# Patient Record
Sex: FEMALE | Race: WHITE | NOT HISPANIC OR LATINO | Employment: OTHER | URBAN - METROPOLITAN AREA
[De-identification: names, ages, dates, MRNs, and addresses within clinical notes are randomized per-mention and may not be internally consistent; named-entity substitution may affect disease eponyms.]

---

## 2018-09-10 ENCOUNTER — HOSPITAL ENCOUNTER (EMERGENCY)
Facility: HOSPITAL | Age: 73
Discharge: HOME | End: 2018-09-10
Attending: EMERGENCY MEDICINE
Payer: COMMERCIAL

## 2018-09-10 VITALS
RESPIRATION RATE: 13 BRPM | WEIGHT: 220 LBS | OXYGEN SATURATION: 95 % | HEART RATE: 58 BPM | SYSTOLIC BLOOD PRESSURE: 178 MMHG | TEMPERATURE: 98 F | DIASTOLIC BLOOD PRESSURE: 94 MMHG | HEIGHT: 66 IN | BODY MASS INDEX: 35.36 KG/M2

## 2018-09-10 DIAGNOSIS — R00.1 BRADYCARDIA: ICD-10-CM

## 2018-09-10 DIAGNOSIS — G89.28 CHRONIC KNEE PAIN AFTER TOTAL REPLACEMENT OF BOTH KNEE JOINTS: ICD-10-CM

## 2018-09-10 DIAGNOSIS — I10 HYPERTENSION, UNCONTROLLED: Primary | ICD-10-CM

## 2018-09-10 DIAGNOSIS — M25.561 CHRONIC KNEE PAIN AFTER TOTAL REPLACEMENT OF BOTH KNEE JOINTS: ICD-10-CM

## 2018-09-10 DIAGNOSIS — Z96.653 CHRONIC KNEE PAIN AFTER TOTAL REPLACEMENT OF BOTH KNEE JOINTS: ICD-10-CM

## 2018-09-10 DIAGNOSIS — M25.562 CHRONIC KNEE PAIN AFTER TOTAL REPLACEMENT OF BOTH KNEE JOINTS: ICD-10-CM

## 2018-09-10 DIAGNOSIS — G47.00 INSOMNIA, UNSPECIFIED TYPE: ICD-10-CM

## 2018-09-10 LAB
ALBUMIN SERPL BCP-MCNC: 4.5 G/DL
ALP SERPL-CCNC: 48 U/L
ALT SERPL W/O P-5'-P-CCNC: 12 U/L
ANION GAP SERPL CALC-SCNC: 11 MMOL/L
AST SERPL-CCNC: 20 U/L
BASOPHILS # BLD AUTO: 0.04 K/UL
BASOPHILS NFR BLD: 0.6 %
BILIRUB SERPL-MCNC: 0.6 MG/DL
BNP SERPL-MCNC: 290 PG/ML
BUN SERPL-MCNC: 13 MG/DL
CALCIUM SERPL-MCNC: 9.5 MG/DL
CHLORIDE SERPL-SCNC: 97 MMOL/L
CO2 SERPL-SCNC: 33 MMOL/L
CREAT SERPL-MCNC: 1.2 MG/DL
D DIMER PPP FEU-MCNC: 276 NG/ML
DIFFERENTIAL METHOD: ABNORMAL
EOSINOPHIL # BLD AUTO: 0.3 K/UL
EOSINOPHIL NFR BLD: 3.6 %
ERYTHROCYTE [DISTWIDTH] IN BLOOD BY AUTOMATED COUNT: 14.5 %
EST. GFR  (AFRICAN AMERICAN): 51.4 ML/MIN/1.73 M^2
EST. GFR  (NON AFRICAN AMERICAN): 44.6 ML/MIN/1.73 M^2
GLUCOSE SERPL-MCNC: 95 MG/DL
HCT VFR BLD AUTO: 39.9 %
HGB BLD-MCNC: 13.1 G/DL
IMM GRANULOCYTES # BLD AUTO: 0.05 K/UL
IMM GRANULOCYTES NFR BLD AUTO: 0.7 %
INR PPP: 1
LYMPHOCYTES # BLD AUTO: 1.3 K/UL
LYMPHOCYTES NFR BLD: 17.9 %
MCH RBC QN AUTO: 33.3 PG
MCHC RBC AUTO-ENTMCNC: 32.8 G/DL
MCV RBC AUTO: 102 FL
MONOCYTES # BLD AUTO: 0.8 K/UL
MONOCYTES NFR BLD: 11.3 %
NEUTROPHILS # BLD AUTO: 4.7 K/UL
NEUTROPHILS NFR BLD: 65.9 %
NRBC BLD-RTO: 0 /100 WBC
PLATELET # BLD AUTO: 292 K/UL
PMV BLD AUTO: 9.5 FL
POTASSIUM SERPL-SCNC: 3.6 MMOL/L
PROT SERPL-MCNC: 7.2 G/DL
PROTHROMBIN TIME: 11 SEC
RBC # BLD AUTO: 3.93 M/UL
SODIUM SERPL-SCNC: 141 MMOL/L
TROPONIN I SERPL DL<=0.01 NG/ML-MCNC: 0.01 NG/ML
WBC # BLD AUTO: 7.15 K/UL

## 2018-09-10 PROCEDURE — 71045 X-RAY EXAM CHEST 1 VIEW: CPT | Mod: 26,,, | Performed by: RADIOLOGY

## 2018-09-10 PROCEDURE — 85025 COMPLETE CBC W/AUTO DIFF WBC: CPT

## 2018-09-10 PROCEDURE — 99284 EMERGENCY DEPT VISIT MOD MDM: CPT | Mod: 25

## 2018-09-10 PROCEDURE — 85379 FIBRIN DEGRADATION QUANT: CPT

## 2018-09-10 PROCEDURE — 71045 X-RAY EXAM CHEST 1 VIEW: CPT | Mod: TC,FY

## 2018-09-10 PROCEDURE — 25000003 PHARM REV CODE 250: Performed by: EMERGENCY MEDICINE

## 2018-09-10 PROCEDURE — 80053 COMPREHEN METABOLIC PANEL: CPT

## 2018-09-10 PROCEDURE — 85610 PROTHROMBIN TIME: CPT

## 2018-09-10 PROCEDURE — 84484 ASSAY OF TROPONIN QUANT: CPT

## 2018-09-10 PROCEDURE — 83880 ASSAY OF NATRIURETIC PEPTIDE: CPT

## 2018-09-10 RX ORDER — ASPIRIN 81 MG/1
81 TABLET ORAL DAILY
COMMUNITY

## 2018-09-10 RX ORDER — HYDRALAZINE HYDROCHLORIDE 25 MG/1
25 TABLET, FILM COATED ORAL
Status: COMPLETED | OUTPATIENT
Start: 2018-09-10 | End: 2018-09-10

## 2018-09-10 RX ORDER — PANTOPRAZOLE SODIUM 40 MG/1
40 TABLET, DELAYED RELEASE ORAL DAILY
COMMUNITY

## 2018-09-10 RX ORDER — ZOLPIDEM TARTRATE 5 MG/1
5 TABLET ORAL NIGHTLY PRN
Qty: 30 TABLET | Refills: 0 | Status: SHIPPED | OUTPATIENT
Start: 2018-09-10

## 2018-09-10 RX ORDER — DABIGATRAN ETEXILATE 150 MG/1
CAPSULE ORAL
COMMUNITY

## 2018-09-10 RX ORDER — FUROSEMIDE 40 MG/1
40 TABLET ORAL DAILY
COMMUNITY
End: 2018-09-10 | Stop reason: SDUPTHER

## 2018-09-10 RX ORDER — HYDRALAZINE HYDROCHLORIDE 50 MG/1
50 TABLET, FILM COATED ORAL EVERY 12 HOURS
Qty: 60 TABLET | Refills: 0 | Status: SHIPPED | OUTPATIENT
Start: 2018-09-10

## 2018-09-10 RX ORDER — HYDRALAZINE HYDROCHLORIDE 10 MG/1
10 TABLET, FILM COATED ORAL EVERY 12 HOURS
COMMUNITY
End: 2018-09-10 | Stop reason: SDUPTHER

## 2018-09-10 RX ORDER — FUROSEMIDE 40 MG/1
40 TABLET ORAL DAILY
Qty: 30 TABLET | Refills: 0 | Status: SHIPPED | OUTPATIENT
Start: 2018-09-10

## 2018-09-10 RX ORDER — CARVEDILOL 6.25 MG/1
6.25 TABLET ORAL 2 TIMES DAILY WITH MEALS
COMMUNITY
End: 2018-09-10 | Stop reason: SDUPTHER

## 2018-09-10 RX ORDER — CARVEDILOL 6.25 MG/1
3.12 TABLET ORAL 2 TIMES DAILY WITH MEALS
Qty: 60 TABLET | Refills: 0 | Status: SHIPPED | OUTPATIENT
Start: 2018-09-10

## 2018-09-10 RX ORDER — AMIODARONE HYDROCHLORIDE 200 MG/1
TABLET ORAL 2 TIMES DAILY
COMMUNITY
End: 2018-09-10 | Stop reason: SDUPTHER

## 2018-09-10 RX ORDER — ZOLPIDEM TARTRATE 5 MG/1
5 TABLET ORAL NIGHTLY PRN
COMMUNITY
End: 2018-09-10 | Stop reason: SDUPTHER

## 2018-09-10 RX ORDER — FERROUS SULFATE 325(65) MG
325 TABLET ORAL
COMMUNITY

## 2018-09-10 RX ORDER — ISOSORBIDE DINITRATE 30 MG/1
30 TABLET ORAL DAILY
COMMUNITY

## 2018-09-10 RX ORDER — HYDROCODONE BITARTRATE AND ACETAMINOPHEN 10; 325 MG/1; MG/1
1 TABLET ORAL
COMMUNITY
End: 2018-09-10 | Stop reason: SDUPTHER

## 2018-09-10 RX ORDER — AMIODARONE HYDROCHLORIDE 200 MG/1
200 TABLET ORAL 2 TIMES DAILY
Qty: 60 TABLET | Refills: 0 | Status: SHIPPED | OUTPATIENT
Start: 2018-09-10

## 2018-09-10 RX ORDER — HYDROCODONE BITARTRATE AND ACETAMINOPHEN 10; 325 MG/1; MG/1
1 TABLET ORAL EVERY 6 HOURS PRN
Qty: 18 TABLET | Refills: 0 | Status: SHIPPED | OUTPATIENT
Start: 2018-09-10

## 2018-09-10 RX ORDER — ROSUVASTATIN CALCIUM 5 MG/1
5 TABLET, COATED ORAL DAILY
COMMUNITY

## 2018-09-10 RX ORDER — ONDANSETRON 4 MG/1
4 TABLET, FILM COATED ORAL EVERY 8 HOURS PRN
COMMUNITY

## 2018-09-10 RX ADMIN — HYDRALAZINE HYDROCHLORIDE 25 MG: 25 TABLET, FILM COATED ORAL at 04:09

## 2018-09-10 NOTE — ED NOTES
Pt resting with friend at bedside. Pt denies chest pain or sob at this time. No complaints or concerns at this time.

## 2018-09-10 NOTE — ED NOTES
Patient refusing to be admitted to hospital. Dr. Tovar speaking with Cristina, case management to get follow up and establish relationship with PCP and Dr. Tovar.

## 2018-09-10 NOTE — ED NOTES
Discharge instructions given to patient both verbal and printed. Educated patient to follow up with Dr. Tovar and PCP as instructed and take medications as prescribed and discussed with Dr. Abdi and Dr. Tovar. Encouraged patient to return to ER as needed for new or worsening symptoms arise. Verbalized understanding. No questions or concerns at this time. Patient ambulatory with steady gait to registration and into the care of her friend.

## 2018-09-10 NOTE — ED PROVIDER NOTES
Encounter Date: 9/10/2018       History     Chief Complaint   Patient presents with    Shortness of Breath     Pt just moved to town. Pt hasn't established primary care. Pt reports being out of some medications. Sob x 2 weeks.     73-year-old female with history of coronary artery disease atrial fibrillation now presents with complaints of shortness of breath, low heart rate and elevated blood pressure.    In the past week she has moved from [place] and has been noncompliant with some of her medications but reports good compliance with the cardioactive medications    She specifically is out of her narcotic analgesia for her bilateral knee pain s/p TKA      Denies acute chest pain  Denies hemoptysis    Pradaxa  Hydralazine  Coreg  Webber  Lasix 40 q AM              Review of patient's allergies indicates:   Allergen Reactions    Statins-hmg-coa reductase inhibitors     Tetracyclines      Past Medical History:   Diagnosis Date    Anticoagulant long-term use     Asthma     Atrial fibrillation     CHF (congestive heart failure)     Coronary artery disease     History of heart artery stent     Hypertension     MI (myocardial infarction)      Past Surgical History:   Procedure Laterality Date    KNEE SURGERY      bl knee surgery    RETINAL DETACHMENT SURGERY      ROTATOR CUFF REPAIR      SHOULDER SURGERY       No family history on file.  Social History     Tobacco Use    Smoking status: Former Smoker    Smokeless tobacco: Never Used   Substance Use Topics    Alcohol use: Yes     Alcohol/week: 4.2 oz     Types: 7 Glasses of wine per week    Drug use: No     Review of Systems   Constitutional: Negative.    HENT: Negative.    Respiratory: Positive for shortness of breath. Negative for apnea, cough, choking, chest tightness, wheezing and stridor.    Cardiovascular: Negative.  Negative for chest pain, palpitations and leg swelling.   Gastrointestinal: Negative.    Musculoskeletal: Positive for arthralgias.  Negative for back pain, gait problem, joint swelling, myalgias, neck pain and neck stiffness.   Neurological: Negative.    Hematological: Negative.    Psychiatric/Behavioral: Negative for confusion.   All other systems reviewed and are negative.      Physical Exam     Initial Vitals [09/10/18 1335]   BP Pulse Resp Temp SpO2   (!) 200/115 (!) 51 20 98.3 °F (36.8 °C) 98 %      MAP       --         Physical Exam    Nursing note and vitals reviewed.  Constitutional: She appears well-developed and well-nourished. She is not diaphoretic. No distress.   HENT:   Head: Normocephalic and atraumatic.   Nose: Nose normal.   Mouth/Throat: Oropharynx is clear and moist. No oropharyngeal exudate.   Eyes: Conjunctivae and EOM are normal. Pupils are equal, round, and reactive to light. Right eye exhibits no discharge. Left eye exhibits no discharge. No scleral icterus.   Neck: Normal range of motion. Neck supple.   Cardiovascular: Regular rhythm, normal heart sounds and intact distal pulses. Bradycardia present.  Exam reveals no gallop and no friction rub.    No murmur heard.  Pulmonary/Chest: Breath sounds normal. No respiratory distress. She has no wheezes. She has no rhonchi. She has no rales.   Abdominal: Soft. Bowel sounds are normal. She exhibits no distension and no mass. There is no tenderness. There is no rebound and no guarding.   Musculoskeletal: Normal range of motion. She exhibits no edema or tenderness.   Lymphadenopathy:     She has no cervical adenopathy.   Neurological: She is alert and oriented to person, place, and time. She has normal strength. No cranial nerve deficit or sensory deficit.   Skin: Skin is warm and dry. Capillary refill takes less than 2 seconds. No rash noted. No erythema. No pallor.   Psychiatric: She has a normal mood and affect. Her behavior is normal. Judgment and thought content normal.         ED Course   Procedures  Labs Reviewed - No data to display  EKG Readings: (Independently  Interpreted)   Sinus bradycardia  Anterior Q-wave  No acute ST T-wave abnormalities suggestive of acute ischemia       Imaging Results    None       X-Rays:   Independently Interpreted Readings:   Chest X-Ray: No acute cardiopulmonary findings.      Medical Decision Making:   Initial Assessment:   HTN uncontrolled  Bradycardia in setting of prior AF - on Amiodarone, Coreg, Hydralazine  Chronic knee pain     Some pulm edema likely as underlying dyspnea - which is likely BP related.       Clinical Tests:   Lab Tests: Ordered and Reviewed  Radiological Study: Ordered  Medical Tests: Ordered and Reviewed  Other:   I have discussed this case with another health care provider.       <> Summary of the Discussion: Dr Mccall     Given bradycardia - I'm reluctant to aggressively treat HTN for concern of hypotension - so will resume hydralazine and encourage diuretic     She was seen by Dr Mccall but refuses offered admit to OBS for control and obs in controlled setting.  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     She is DCd home as per AVS with RXs written and follow up arranged.                        Clinical Impression:   The primary encounter diagnosis was Hypertension, uncontrolled. 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                             Tacho Abdi MD  09/11/18 3181

## 2018-09-10 NOTE — DISCHARGE INSTRUCTIONS
Hydralazine 50 twice daily   Lasix  Amiodarone   Coreg - decreased to 3.125 twice daily   Follow up as discussed with Dr Mccall with Dr Tovar

## 2018-09-10 NOTE — PLAN OF CARE
09/10/18 1700   Final Note   Assessment Type Final Discharge Note   Spoke to patient in ER. Will call patient in morning with follow up with Dr Viramontes. Recently moved here from [place] & needing a PCP. Denies any other discharge needs at this time.

## 2018-09-11 NOTE — PLAN OF CARE
09/11/18 1149   Final Note   Assessment Type Final Discharge Note   Called Dr Viramontes's office to make appointment. States they will call patient with appointment after they talk to Dr Viramontes. Left message for patient that his office will be contacting her. Left MD phone number & my phone number if she doesn't hear from them to let me know.